# Patient Record
Sex: MALE | Race: OTHER | Employment: OTHER | URBAN - METROPOLITAN AREA
[De-identification: names, ages, dates, MRNs, and addresses within clinical notes are randomized per-mention and may not be internally consistent; named-entity substitution may affect disease eponyms.]

---

## 2022-04-14 ENCOUNTER — HOSPITAL ENCOUNTER (EMERGENCY)
Age: 32
Discharge: HOME | End: 2022-04-14
Attending: STUDENT IN AN ORGANIZED HEALTH CARE EDUCATION/TRAINING PROGRAM
Payer: OTHER GOVERNMENT

## 2022-04-14 VITALS
HEIGHT: 71 IN | TEMPERATURE: 97.5 F | BODY MASS INDEX: 28 KG/M2 | DIASTOLIC BLOOD PRESSURE: 61 MMHG | HEART RATE: 63 BPM | RESPIRATION RATE: 18 BRPM | SYSTOLIC BLOOD PRESSURE: 121 MMHG | WEIGHT: 200 LBS | OXYGEN SATURATION: 100 %

## 2022-04-14 DIAGNOSIS — L50.9 HIVES: ICD-10-CM

## 2022-04-14 DIAGNOSIS — T78.40XA ALLERGIC REACTION, INITIAL ENCOUNTER: Primary | ICD-10-CM

## 2022-04-14 PROCEDURE — 74011250637 HC RX REV CODE- 250/637: Performed by: STUDENT IN AN ORGANIZED HEALTH CARE EDUCATION/TRAINING PROGRAM

## 2022-04-14 PROCEDURE — 99283 EMERGENCY DEPT VISIT LOW MDM: CPT

## 2022-04-14 PROCEDURE — 74011636637 HC RX REV CODE- 636/637: Performed by: STUDENT IN AN ORGANIZED HEALTH CARE EDUCATION/TRAINING PROGRAM

## 2022-04-14 RX ORDER — PREDNISONE 20 MG/1
60 TABLET ORAL
Status: COMPLETED | OUTPATIENT
Start: 2022-04-14 | End: 2022-04-14

## 2022-04-14 RX ORDER — HYDROXYZINE 25 MG/1
25 TABLET, FILM COATED ORAL
Status: DISCONTINUED | OUTPATIENT
Start: 2022-04-14 | End: 2022-04-14 | Stop reason: HOSPADM

## 2022-04-14 RX ORDER — METHYLPREDNISOLONE 4 MG/1
TABLET ORAL
Qty: 1 DOSE PACK | Refills: 0 | Status: SHIPPED | OUTPATIENT
Start: 2022-04-14 | End: 2022-04-17 | Stop reason: DRUGHIGH

## 2022-04-14 RX ORDER — HYDROXYZINE 50 MG/1
50 TABLET, FILM COATED ORAL
Qty: 20 TABLET | Refills: 0 | Status: SHIPPED | OUTPATIENT
Start: 2022-04-14 | End: 2022-04-24

## 2022-04-14 RX ADMIN — HYDROXYZINE HYDROCHLORIDE 25 MG: 25 TABLET, FILM COATED ORAL at 07:04

## 2022-04-14 RX ADMIN — PREDNISONE 60 MG: 20 TABLET ORAL at 07:04

## 2022-04-14 NOTE — ED PROVIDER NOTES
EMERGENCY DEPARTMENT HISTORY AND PHYSICAL EXAM    7:02 AM    Date: 4/14/2022  Patient Name: Parvin Lovell    History of Presenting Illness     Chief Complaint   Patient presents with    Allergic Reaction       History Provided By: Patient  Location/Duration/Severity/Modifying factors   HPI   Parvin Lovell is a 27 y.o. male with no past medical problems presenting for evaluation of allergic reaction. Patient states that yesterday he started using a new soap and he thinks he is allergic to it. He ate his usual preworkout, worked out and in the shower using the new soap noticed that he had a hives rash all over his arms, back and legs. Does not involve his mucous membranes or eyes. It is itchy but not painful. He tried to take Benadryl last night but it did not help. Nothing is making his symptoms worse. He said this is happened once before when he took aspirin so he avoids it. No new changes in diet, medications. Denies any wheezing, difficulty breathing, cough, shortness of breath, abdominal pain, nausea, vomiting or other anaphylactic symptoms. No other medical complaints. PCP: Trisha, MD Cindy    Current Facility-Administered Medications   Medication Dose Route Frequency Provider Last Rate Last Admin    hydrOXYzine HCL (ATARAX) tablet 25 mg  25 mg Oral TID PRN Constanza Ron MD   25 mg at 04/14/22 0704     Current Outpatient Medications   Medication Sig Dispense Refill    methylPREDNISolone (Medrol, Pablito,) 4 mg tablet Take as prescribed starting 4/15/22 1 Dose Pack 0    hydrOXYzine HCL (ATARAX) 50 mg tablet Take 1 Tablet by mouth every six (6) hours as needed for Itching for up to 10 days. 20 Tablet 0       Past History     Past Medical History:  Past Medical History:   Diagnosis Date    Non-nicotine vapor product user        Past Surgical History:  History reviewed. No pertinent surgical history. Family History:  History reviewed. No pertinent family history.     Social History:  Social History     Tobacco Use    Smoking status: Current Some Day Smoker    Smokeless tobacco: Never Used   Substance Use Topics    Alcohol use: Never    Drug use: Never       Allergies: Allergies   Allergen Reactions    Aspirin Rash and Swelling       I reviewed and confirmed the above information with patient and updated as necessary. Review of Systems     Review of Systems   Constitutional: Negative for diaphoresis and fever. HENT: Negative for ear pain and sore throat. Eyes:        No acute change in vision   Respiratory: Negative for cough and shortness of breath. Cardiovascular: Negative for chest pain and leg swelling. Gastrointestinal: Negative for abdominal pain and vomiting. Genitourinary: Negative for dysuria. Musculoskeletal: Negative for neck pain. Skin: Positive for rash. Negative for wound. Neurological: Negative for weakness and headaches. Physical Exam     Visit Vitals  /61   Pulse 63   Temp 97.5 °F (36.4 °C)   Resp 18   Ht 5' 11\" (1.803 m)   Wt 90.7 kg (200 lb)   SpO2 100%   BMI 27.89 kg/m²       Physical Exam  Vitals and nursing note reviewed. Constitutional:       Appearance: Normal appearance. He is not ill-appearing. Comments: Adult male resting comfortably   HENT:      Mouth/Throat:      Mouth: Mucous membranes are moist.   Eyes:      Pupils: Pupils are equal, round, and reactive to light. Cardiovascular:      Rate and Rhythm: Normal rate and regular rhythm. Pulses: Normal pulses. Heart sounds: Normal heart sounds. Pulmonary:      Effort: Pulmonary effort is normal.      Breath sounds: Normal breath sounds. Abdominal:      General: Abdomen is flat. Tenderness: There is no abdominal tenderness. Musculoskeletal:         General: No swelling. Normal range of motion. Cervical back: Normal range of motion. Skin:     General: Skin is warm. Findings: Rash (Hives rash on torso, extremities. Not on mucous membranes) present. Neurological:      General: No focal deficit present. Mental Status: He is alert and oriented to person, place, and time. Diagnostic Study Results     Labs -  No results found for this or any previous visit (from the past 24 hour(s)). Radiologic Studies -   No orders to display           Medical Decision Making   I am the first provider for this patient. I reviewed the vital signs, available nursing notes, past medical history, past surgical history, family history and social history. Vital Signs-Reviewed the patient's vital signs. Records Reviewed: Nursing Notes and Old Medical Records (Time of Review: 7:02 AM)    Provider Notes (Medical Decision Making):   MDM  26-year-old male here with a hives rash, no evidence of anaphylaxis, Bennett-Lior, doubt contact dermatitis. ED Course: Progress Notes, Reevaluation, and Consults:  Patient afebrile hemodynamically normal  Cardiopulmonary exams unremarkable, no wheezing or stridor    Discussed care plan with the patient, will treat with prednisone and Atarax here, sent prescriptions for the same. He will discontinue the soap that he is using. Signs and symptoms prompt return to the ED were discussed. He was discharged home in stable condition           Procedures    Diagnosis     Clinical Impression:   1. Allergic reaction, initial encounter    2. Hives        Disposition: Home    Follow-up Information     Follow up With Specialties Details Why [address]    [facility] EMERGENCY DEPT Emergency Medicine  As needed, If symptoms worsen [address] [place] [address] [phone]           Patient's Medications   Start Taking    HYDROXYZINE HCL (ATARAX) 50 MG TABLET    Take 1 Tablet by mouth every six (6) hours as needed for Itching for up to 10 days.     METHYLPREDNISOLONE (MEDROL, MARY,) 4 MG TABLET    Take as prescribed starting 4/15/22   Continue Taking    No medications on file   These Medications have changed    No medications on file   Stop Taking    No medications on file       Stephanie Chandler MD   Emergency Medicine   April 14, 2022, 7:02 AM     This note is dictated utilizing Dragon voice recognition software. Unfortunately this leads to occasional typographical errors using the voice recognition. I apologize in advance if the situation occurs. If questions occur please do not hesitate to contact me directly.     Petros Bond MD

## 2022-04-14 NOTE — ED TRIAGE NOTES
Patient arrives ambulatory to ED with c/c of a rash all over, started yesterday. He thinks it may be from using a new type of soap.  He says he took benadryl last night at 8pm but it did not help

## 2022-04-14 NOTE — Clinical Note
[facility] [place]  [facility] EMERGENCY DEPT  [address] [facility] [address] [phone]    Work/School Note    Date: 4/14/2022    To Whom It May concern: Felix Guy was seen and treated today in the emergency room by the following provider(s):  Attending Provider: Sophia Junior MD.      Felix Guy is excused from work/school on 04/14/22 and 04/15/22. He is medically clear to return to work/school on 4/16/2022.        Sincerely,          Sarah Storm MD

## 2022-04-14 NOTE — DISCHARGE INSTRUCTIONS
Start taking the steroids 4/15/22. Use the atarax as needed for itching. Stop using the soap that you think you are allergic to. Return to the ED as needed.

## 2022-04-17 ENCOUNTER — HOSPITAL ENCOUNTER (EMERGENCY)
Age: 32
Discharge: HOME | End: 2022-04-17
Attending: STUDENT IN AN ORGANIZED HEALTH CARE EDUCATION/TRAINING PROGRAM
Payer: OTHER GOVERNMENT

## 2022-04-17 VITALS
RESPIRATION RATE: 18 BRPM | DIASTOLIC BLOOD PRESSURE: 65 MMHG | SYSTOLIC BLOOD PRESSURE: 142 MMHG | WEIGHT: 200 LBS | HEIGHT: 71 IN | TEMPERATURE: 97.7 F | OXYGEN SATURATION: 99 % | HEART RATE: 59 BPM | BODY MASS INDEX: 28 KG/M2

## 2022-04-17 DIAGNOSIS — L51.9 ERYTHEMA MULTIFORME: Primary | ICD-10-CM

## 2022-04-17 PROCEDURE — 99283 EMERGENCY DEPT VISIT LOW MDM: CPT

## 2022-04-17 RX ORDER — PREDNISONE 20 MG/1
20 TABLET ORAL DAILY
Qty: 5 TABLET | Refills: 0 | Status: SHIPPED | OUTPATIENT
Start: 2022-04-17 | End: 2022-04-22

## 2022-04-17 NOTE — ED TRIAGE NOTES
Pt arrived ambulatory to triage with c/o rash to bilateral arms that started last Wednesday, pt just moved to new appt  Pt was seen here on Tuesday and prescribed hydroxyzine and prednisone but no improvements

## 2022-04-17 NOTE — DISCHARGE INSTRUCTIONS
Please take your medications as prescribed. Please follow-up with your primary care provider for further management of her current condition. It is recommended that you see an allergist for allergy testing.   Return to the ER immediately for any new or worsening symptoms such as worsening rash that includes your mouth, palms and soles of your hand and feet, fever over 100.4 Fahrenheit and or any other concerning symptoms

## 2022-04-17 NOTE — ED PROVIDER NOTES
EMERGENCY DEPARTMENT HISTORY AND PHYSICAL EXAM    Date: 4/17/2022  Patient Name: Guillermo Montes    History of Presenting Illness     Chief Complaint   Patient presents with    Rash       History Provided By: Patient     History Kiara Badillo): Guillermo Montes is a 27 y.o. male with no PMHX presents to the ER for rash. Patient states that he was seen in the ER on Thursday, had taken his prescription medications as directed, states that whenever he runs out of his medications his rash returns so he came back for evaluation. Patient states that the rash is itchy, it is on bilateral upper and lower extremities as well as his trunks. Not associated with any pain, fever, chills, difficulty breathing, difficulty swallowing or any other concerning symptoms. Patient denies any new contacts or allergens. Chief Complaint: Rash  Onset: 4 days ago  Timing: Sudden  Context: Symptoms started spontaneously, symptoms have   waxed and waned since onset  Location: Full body sparing the palms of hands and soles of feet  Quality: Pruritic  Severity: Mild  Modifying Factors: Nothing seems to make it worse or better but the medications do make his symptoms go away for a few hours. Associated Symptoms: denies any other associated signs or symptoms    PCP: Cindy Rico MD     Current Outpatient Medications   Medication Sig Dispense Refill    predniSONE (DELTASONE) 20 mg tablet Take 20 mg by mouth daily for 5 days. With Breakfast 5 Tablet 0    hydrOXYzine HCL (ATARAX) 50 mg tablet Take 1 Tablet by mouth every six (6) hours as needed for Itching for up to 10 days. 20 Tablet 0         Review of Systems      REVIEW OF SYSTEMS:    CONST: Negative for fever, body aches and chills. HENT: Negative for neck pain/stiffness, headache, congestion, sore throat, swelling. EYES: Negative for discharge/pain or vision changes. RESP: Negative for cough/hemoptysis and shortness of breath.     CV: Negative chest pain, difficulty breathing, palpitations. ABD: Negative pain, nausea, vomiting. : Negative increase frequency, dysuria, blood in urine or stool. MUSC: Negative for muscle weakness or edema. SKIN: Positive for rash, no lesions/sores. NEURO: Negative headache, dizziness, focal neurological deficits. Past History     Past Medical History:  Past Medical History:   Diagnosis Date    Non-nicotine vapor product user        Past Surgical History:  No past surgical history on file. Family History:  No family history on file. Reviewed and non-contributory    Social History:  Social History     Tobacco Use    Smoking status: Current Some Day Smoker    Smokeless tobacco: Never Used   Substance Use Topics    Alcohol use: Never    Drug use: Never       Allergies: Allergies   Allergen Reactions    Aspirin Rash and Swelling         Physical Exam     Vitals:    04/17/22 1227   BP: (!) 142/65   Pulse: (!) 59   Resp: 18   Temp: 97.7 °F (36.5 °C)   SpO2: 99%   Weight: 90.7 kg (200 lb)   Height: 5' 11\" (1.803 m)       Physical Exam  Vitals and nursing note reviewed. Constitutional:       General: Pt is not in acute distress. Appearance: Pt is well-developed, not diaphoretic. HENT:      Head: Normocephalic and atraumatic. Ear: External ear normal b/l     Nose: Nose normal.      Throat: Posterior pharynx is patent, no swelling or erythema, no lesions appreciated  Eyes:      General: No scleral icterus, EOMI     Pupil: KELLE     Conjunctiva/sclera: Conjunctivae normal.   Cardiovascular:      Rate and Rhythm: Normal rate and regular rhythm. Heart sounds: Normal heart sounds   Pulmonary:      Effort: Pulmonary effort is normal. No tachypnea, accessory muscle usage or respiratory distress. Breath sounds: Normal breath sounds. No decreased breath sounds, wheezing, rhonchi or rales. Musculoskeletal:         General: Normal range of motion. Cervical back: Normal range of motion.    Skin:     General: Skin is warm and dry. Patient with diffuse blanching rash to the upper and lower extremities including his trunk bilaterally, no rash noted to the palms and soles, no rash to the oral mucosa or face. Neurological:      Mental Status: Pt is alert and oriented to person, place, and time. Psychiatric:         Behavior: Behavior normal.         Judgment: Judgment normal.      Diagnostic Study Results     Labs -   No results found for this or any previous visit (from the past 12 hour(s)). Radiologic Studies -  No orders to display     CT Results  (Last 48 hours)    None        CXR Results  (Last 48 hours)    None          Medications given in the ED-  Medications - No data to display      Medical Decision Making   I am the first provider for this patient. I reviewed the vital signs, available nursing notes, past medical history, past surgical history, family history and social history. Vital Signs-Reviewed the patient's vital signs. Pulse Oximetry Analysis - 99% on RA     Cardiac Monitor:  Rate: 59 bpm  Rhythm: sinus rhythm    ED Course:   1:02 PM Initial assessment performed. The patients presenting problems have been discussed, and they are in agreement with the care plan formulated and outlined with them. I have encouraged them to ask questions as they arise throughout their visit. 1:07 PM patient was seen and examined at bedside in no acute distress, rash appears to be similar to erythema multiform, unlikely Bennett-Lior's or anaphylaxis. Prednisone has been working for his symptoms, will change the dose to 20 mg by mouth for 5 days as a burst dose, continue to take his antihistamine as prescribed. Patient told to follow-up with his primary care provider for further work-up and management of today's condition. Diagnosis and Disposition       DISCHARGE NOTE:    Libia Beltrán  results have been reviewed with him. He has been counseled regarding his diagnosis, treatment, and plan. He verbally conveys understanding and agreement of the signs, symptoms, diagnosis, treatment and prognosis and additionally agrees to follow up as discussed. He also agrees with the care-plan and conveys that all of his questions have been answered. I have also provided discharge instructions for him that include: educational information regarding their diagnosis and treatment, and list of reasons why they would want to return to the ED prior to their follow-up appointment, should his condition change. He has been provided with education for proper emergency department utilization. CLINICAL IMPRESSION:      1. Erythema multiforme        PLAN:  1. D/C Home  2. Discharge Medication List as of 4/17/2022  1:01 PM        3. Follow-up Information     Follow up With Specialties Details Why [address]  In 1 week               Dragon Disclaimer     Please note that this dictation was completed with [employer], the computer voice recognition software. Quite often unanticipated grammatical, syntax, homophones, and other interpretive errors are inadvertently transcribed by the computer software. Please disregard these errors. Please excuse any errors that have escaped final proofreading.     Julia Courser, DO

## 2022-04-17 NOTE — Clinical Note
[facility] [place]  [facility] EMERGENCY DEPT  [address] [facility] [address]  [address] [phone]    Work/School Note    Date: 4/17/2022    To Whom It May concern: Philis Ormond was seen and treated today in the emergency room by the following provider(s):  Attending Provider: Nestor Sivla DO. Philis Ormond is excused from work/school on 04/17/22 and 04/18/22. He is medically clear to return to work/school on 4/19/2022.        Sincerely,          Daysi Arguelles DO